# Patient Record
Sex: MALE | Race: OTHER | Employment: FULL TIME | URBAN - METROPOLITAN AREA
[De-identification: names, ages, dates, MRNs, and addresses within clinical notes are randomized per-mention and may not be internally consistent; named-entity substitution may affect disease eponyms.]

---

## 2018-03-09 ENCOUNTER — HOSPITAL ENCOUNTER (OUTPATIENT)
Dept: MRI IMAGING | Age: 58
Discharge: HOME | End: 2018-03-09
Attending: OPHTHALMOLOGY
Payer: COMMERCIAL

## 2018-03-09 DIAGNOSIS — H05.259: ICD-10-CM

## 2018-03-09 PROCEDURE — 70540 MRI ORBIT/FACE/NECK W/O DYE: CPT | Performed by: OPHTHALMOLOGY

## 2018-05-05 ENCOUNTER — HOSPITAL ENCOUNTER (OUTPATIENT)
Dept: MRI IMAGING | Age: 58
Discharge: HOME | End: 2018-05-05
Attending: OPHTHALMOLOGY
Payer: COMMERCIAL

## 2018-05-05 DIAGNOSIS — H05.20 PROPTOSIS: ICD-10-CM

## 2018-05-05 PROCEDURE — 70553 MRI BRAIN STEM W/O & W/DYE: CPT | Performed by: OPHTHALMOLOGY

## 2018-05-05 PROCEDURE — 70543 MRI ORBT/FAC/NCK W/O &W/DYE: CPT | Performed by: OPHTHALMOLOGY

## 2018-11-23 RX ORDER — AMOXICILLIN 500 MG
CAPSULE ORAL DAILY
COMMUNITY
End: 2019-05-03 | Stop reason: ALTCHOICE

## 2018-11-30 ENCOUNTER — ANESTHESIA EVENT (OUTPATIENT)
Dept: ENDOSCOPY | Facility: HOSPITAL | Age: 58
End: 2018-11-30
Payer: COMMERCIAL

## 2018-11-30 ENCOUNTER — HOSPITAL ENCOUNTER (OUTPATIENT)
Facility: HOSPITAL | Age: 58
Setting detail: HOSPITAL OUTPATIENT SURGERY
Discharge: HOME | End: 2018-11-30
Attending: INTERNAL MEDICINE | Admitting: INTERNAL MEDICINE
Payer: COMMERCIAL

## 2018-11-30 ENCOUNTER — ANESTHESIA (OUTPATIENT)
Dept: ENDOSCOPY | Facility: HOSPITAL | Age: 58
End: 2018-11-30
Payer: COMMERCIAL

## 2018-11-30 VITALS
HEIGHT: 63 IN | BODY MASS INDEX: 46.6 KG/M2 | WEIGHT: 263 LBS | OXYGEN SATURATION: 99 % | HEART RATE: 59 BPM | SYSTOLIC BLOOD PRESSURE: 131 MMHG | TEMPERATURE: 98 F | RESPIRATION RATE: 16 BRPM | DIASTOLIC BLOOD PRESSURE: 89 MMHG

## 2018-11-30 DIAGNOSIS — K21.00 GASTROESOPHAGEAL REFLUX DISEASE WITH ESOPHAGITIS: ICD-10-CM

## 2018-11-30 PROCEDURE — 88305 TISSUE EXAM BY PATHOLOGIST: CPT | Performed by: INTERNAL MEDICINE

## 2018-11-30 PROCEDURE — 0DB68ZX EXCISION OF STOMACH, VIA NATURAL OR ARTIFICIAL OPENING ENDOSCOPIC, DIAGNOSTIC: ICD-10-PCS | Performed by: INTERNAL MEDICINE

## 2018-11-30 RX ORDER — SODIUM CHLORIDE, SODIUM LACTATE, POTASSIUM CHLORIDE, CALCIUM CHLORIDE 600; 310; 30; 20 MG/100ML; MG/100ML; MG/100ML; MG/100ML
INJECTION, SOLUTION INTRAVENOUS CONTINUOUS
Status: DISCONTINUED | OUTPATIENT
Start: 2018-11-30 | End: 2018-11-30

## 2018-11-30 RX ORDER — NALOXONE HYDROCHLORIDE 0.4 MG/ML
80 INJECTION, SOLUTION INTRAMUSCULAR; INTRAVENOUS; SUBCUTANEOUS AS NEEDED
Status: CANCELLED | OUTPATIENT
Start: 2018-11-30 | End: 2018-11-30

## 2018-11-30 RX ORDER — SODIUM CHLORIDE, SODIUM LACTATE, POTASSIUM CHLORIDE, CALCIUM CHLORIDE 600; 310; 30; 20 MG/100ML; MG/100ML; MG/100ML; MG/100ML
INJECTION, SOLUTION INTRAVENOUS CONTINUOUS
Status: CANCELLED | OUTPATIENT
Start: 2018-11-30

## 2018-11-30 NOTE — ANESTHESIA POSTPROCEDURE EVALUATION
[address] Patient Status:  Hospital Outpatient Surgery   Age/Gender 60year old male MRN [id number]   Location 118 [facility] Attending Zarina Jones, 160[facility] Day # 0 PCP Florentino Jackson MD       Anesthesia Post-op Note

## 2018-11-30 NOTE — ANESTHESIA PREPROCEDURE EVALUATION
PRE-OP EVALUATION    Patient Name: Stevphen Soulier    Pre-op Diagnosis: Gastroesophageal reflux disease with esophagitis [K21.0]    Procedure(s):  ESOPHAGOGASTRODUODENOSCOPY    Surgeon(s) and Role:     * Binh Lovelace, DO - Primary    Pre-op vitals revie Available pre-op labs reviewed. Airway      Mallampati: III  Mouth opening: >3 FB  TM distance: > 6 cm  Neck ROM: limited Cardiovascular    Cardiovascular exam normal.         Dental    No notable dental history.          Pulmonary    Pulm

## 2018-11-30 NOTE — H&P
History & Physical Examination    Patient Name: Daryle Pain  MRN: [id number]  [facility]: [id number]  YOB: 1960    Diagnosis: chronic gerd    Present Illness: 59 y/o M history as above presents for EGD      Medications Prior to Admission:  Omega-3 If not normal, please explain:   HEENT [ x] [x ]    NECK & BACK [ x] [ x]    HEART [ x] [x ]    LUNGS [ x] [ x]    ABDOMEN [ x] [x ]    UROGENITAL [ n/a] [ n/a]    EXTREMITIES [ x] [x ]    OTHER        [ x ] I have discussed the risks and benefits and alte

## 2018-11-30 NOTE — OPERATIVE REPORT
Qasim Curtis Patient Status:  Hospital Outpatient Surgery    1960 MRN [id number]   [facility] ENDOSCOPY Attending Bartolo De León, DO   Hosp Day # 0 PCP Aren Steiner MD       PREOPERATIVE DIAGNOSIS/INDICATION: Chronic STEVEN ranitidine. 3. Follow anti-reflux measures as discussed in office. 4. Follow up in GI office in 3 months w/ NP.       Jenise Flanagan  Gastroenterology/Advanced Endoscopy  [facility]

## 2019-05-02 ENCOUNTER — ORDER TRANSCRIPTION (OUTPATIENT)
Dept: ADMINISTRATIVE | Facility: HOSPITAL | Age: 59
End: 2019-05-02

## 2019-05-02 DIAGNOSIS — M19.012 PRIMARY OSTEOARTHRITIS OF LEFT SHOULDER: Primary | ICD-10-CM

## 2019-05-03 RX ORDER — SODIUM CHLORIDE, SODIUM LACTATE, POTASSIUM CHLORIDE, CALCIUM CHLORIDE 600; 310; 30; 20 MG/100ML; MG/100ML; MG/100ML; MG/100ML
INJECTION, SOLUTION INTRAVENOUS CONTINUOUS
Status: CANCELLED | OUTPATIENT
Start: 2019-05-03

## 2019-05-03 RX ORDER — HYDROCODONE BITARTRATE AND ACETAMINOPHEN 5; 325 MG/1; MG/1
1 TABLET ORAL EVERY 6 HOURS PRN
COMMUNITY
End: 2019-10-10 | Stop reason: ALTCHOICE

## 2019-05-10 ENCOUNTER — HOSPITAL ENCOUNTER (OUTPATIENT)
Dept: MRI IMAGING | Facility: HOSPITAL | Age: 59
Discharge: HOME | End: 2019-05-10
Attending: ORTHOPAEDIC SURGERY
Payer: COMMERCIAL

## 2019-05-10 ENCOUNTER — HOSPITAL ENCOUNTER (OUTPATIENT)
Dept: GENERAL RADIOLOGY | Facility: HOSPITAL | Age: 59
Discharge: HOME | End: 2019-05-10
Attending: ORTHOPAEDIC SURGERY
Payer: COMMERCIAL

## 2019-05-10 ENCOUNTER — ANESTHESIA EVENT (OUTPATIENT)
Dept: GENERAL RADIOLOGY | Facility: HOSPITAL | Age: 59
End: 2019-05-10
Payer: COMMERCIAL

## 2019-05-10 ENCOUNTER — ANESTHESIA (OUTPATIENT)
Dept: GENERAL RADIOLOGY | Facility: HOSPITAL | Age: 59
End: 2019-05-10
Payer: COMMERCIAL

## 2019-05-10 VITALS
WEIGHT: 255 LBS | RESPIRATION RATE: 16 BRPM | DIASTOLIC BLOOD PRESSURE: 75 MMHG | OXYGEN SATURATION: 99 % | BODY MASS INDEX: 45.18 KG/M2 | HEART RATE: 61 BPM | TEMPERATURE: 97 F | HEIGHT: 63 IN | SYSTOLIC BLOOD PRESSURE: 126 MMHG

## 2019-05-10 DIAGNOSIS — M19.012 PRIMARY OSTEOARTHRITIS OF LEFT SHOULDER: ICD-10-CM

## 2019-05-10 DIAGNOSIS — Z13.89 ENCOUNTER FOR IMAGING TO SCREEN FOR METAL PRIOR TO MRI: ICD-10-CM

## 2019-05-10 PROCEDURE — 73222 MRI JOINT UPR EXTREM W/DYE: CPT | Performed by: ORTHOPAEDIC SURGERY

## 2019-05-10 PROCEDURE — 77002 NEEDLE LOCALIZATION BY XRAY: CPT | Performed by: ORTHOPAEDIC SURGERY

## 2019-05-10 PROCEDURE — A9575 INJ GADOTERATE MEGLUMI 0.1ML: HCPCS | Performed by: ORTHOPAEDIC SURGERY

## 2019-05-10 PROCEDURE — 23350 INJECTION FOR SHOULDER X-RAY: CPT | Performed by: ORTHOPAEDIC SURGERY

## 2019-05-10 RX ORDER — MEPERIDINE HYDROCHLORIDE 25 MG/ML
12.5 INJECTION INTRAMUSCULAR; INTRAVENOUS; SUBCUTANEOUS AS NEEDED
Status: DISCONTINUED | OUTPATIENT
Start: 2019-05-10 | End: 2019-05-12

## 2019-05-10 RX ORDER — MIDAZOLAM HYDROCHLORIDE 1 MG/ML
1 INJECTION INTRAMUSCULAR; INTRAVENOUS EVERY 5 MIN PRN
Status: ACTIVE | OUTPATIENT
Start: 2019-05-10 | End: 2019-05-10

## 2019-05-10 RX ORDER — ONDANSETRON 2 MG/ML
4 INJECTION INTRAMUSCULAR; INTRAVENOUS AS NEEDED
Status: ACTIVE | OUTPATIENT
Start: 2019-05-10 | End: 2019-05-10

## 2019-05-10 RX ORDER — HYDROCODONE BITARTRATE AND ACETAMINOPHEN 5; 325 MG/1; MG/1
1 TABLET ORAL AS NEEDED
Status: DISCONTINUED | OUTPATIENT
Start: 2019-05-10 | End: 2019-05-12

## 2019-05-10 RX ORDER — SODIUM CHLORIDE, SODIUM LACTATE, POTASSIUM CHLORIDE, CALCIUM CHLORIDE 600; 310; 30; 20 MG/100ML; MG/100ML; MG/100ML; MG/100ML
INJECTION, SOLUTION INTRAVENOUS CONTINUOUS
Status: DISCONTINUED | OUTPATIENT
Start: 2019-05-10 | End: 2019-05-12

## 2019-05-10 RX ORDER — METOCLOPRAMIDE HYDROCHLORIDE 5 MG/ML
10 INJECTION INTRAMUSCULAR; INTRAVENOUS AS NEEDED
Status: ACTIVE | OUTPATIENT
Start: 2019-05-10 | End: 2019-05-10

## 2019-05-10 RX ORDER — HYDROMORPHONE HYDROCHLORIDE 1 MG/ML
0.4 INJECTION, SOLUTION INTRAMUSCULAR; INTRAVENOUS; SUBCUTANEOUS EVERY 5 MIN PRN
Status: ACTIVE | OUTPATIENT
Start: 2019-05-10 | End: 2019-05-10

## 2019-05-10 RX ORDER — HYDROCODONE BITARTRATE AND ACETAMINOPHEN 5; 325 MG/1; MG/1
2 TABLET ORAL AS NEEDED
Status: DISCONTINUED | OUTPATIENT
Start: 2019-05-10 | End: 2019-05-12

## 2019-05-10 RX ORDER — NALOXONE HYDROCHLORIDE 0.4 MG/ML
80 INJECTION, SOLUTION INTRAMUSCULAR; INTRAVENOUS; SUBCUTANEOUS AS NEEDED
Status: ACTIVE | OUTPATIENT
Start: 2019-05-10 | End: 2019-05-10

## 2019-05-10 RX ORDER — DIPHENHYDRAMINE HYDROCHLORIDE 50 MG/ML
12.5 INJECTION INTRAMUSCULAR; INTRAVENOUS AS NEEDED
Status: ACTIVE | OUTPATIENT
Start: 2019-05-10 | End: 2019-05-10

## 2019-05-10 NOTE — ANESTHESIA POSTPROCEDURE EVALUATION
[address] Patient Status:  Outpatient   Age/Gender 60year old male MRN [id number]   [facility] MRI Attending Nasir Craft MD   Hosp Day # 0 PCP Dominga Villafana MD       Anesthesia Post-op Note    * No procedures listed *

## 2019-05-10 NOTE — ANESTHESIA PREPROCEDURE EVALUATION
PRE-OP EVALUATION    Patient Name: Neela White    Pre-op Diagnosis: * No pre-op diagnosis entered *    * No procedures listed *    * No surgeons found in log *    Pre-op vitals reviewed.   Temp: 97.4 °F (36.3 °C)  Pulse: 67  Resp: 14  BP: 132/83  SpO2: 9 Smoking status: Former Smoker        Packs/day: 0.50        Years: 10.00        Pack years: 5        Quit date: 1985        Years since quittin.4      Smokeless tobacco: Never Used      Tobacco comment: quit in early 's     Alcohol use:  Yes

## 2019-06-23 ENCOUNTER — APPOINTMENT (OUTPATIENT)
Dept: ULTRASOUND IMAGING | Facility: HOSPITAL | Age: 59
End: 2019-06-23
Attending: EMERGENCY MEDICINE
Payer: COMMERCIAL

## 2019-06-23 ENCOUNTER — APPOINTMENT (OUTPATIENT)
Dept: CT IMAGING | Facility: HOSPITAL | Age: 59
End: 2019-06-23
Attending: EMERGENCY MEDICINE
Payer: COMMERCIAL

## 2019-06-23 ENCOUNTER — APPOINTMENT (OUTPATIENT)
Dept: GENERAL RADIOLOGY | Facility: HOSPITAL | Age: 59
End: 2019-06-23
Attending: EMERGENCY MEDICINE
Payer: COMMERCIAL

## 2019-06-23 ENCOUNTER — HOSPITAL ENCOUNTER (EMERGENCY)
Facility: HOSPITAL | Age: 59
Discharge: HOME | End: 2019-06-23
Attending: EMERGENCY MEDICINE
Payer: COMMERCIAL

## 2019-06-23 VITALS
DIASTOLIC BLOOD PRESSURE: 75 MMHG | OXYGEN SATURATION: 96 % | TEMPERATURE: 97 F | HEART RATE: 80 BPM | RESPIRATION RATE: 20 BRPM | BODY MASS INDEX: 42.68 KG/M2 | SYSTOLIC BLOOD PRESSURE: 136 MMHG | HEIGHT: 64 IN | WEIGHT: 250 LBS

## 2019-06-23 DIAGNOSIS — R06.02 SHORTNESS OF BREATH: Primary | ICD-10-CM

## 2019-06-23 PROBLEM — E87.6 HYPOKALEMIA: Status: ACTIVE | Noted: 2019-06-23

## 2019-06-23 PROCEDURE — 81003 URINALYSIS AUTO W/O SCOPE: CPT | Performed by: EMERGENCY MEDICINE

## 2019-06-23 PROCEDURE — 84484 ASSAY OF TROPONIN QUANT: CPT | Performed by: EMERGENCY MEDICINE

## 2019-06-23 PROCEDURE — 93971 EXTREMITY STUDY: CPT | Performed by: EMERGENCY MEDICINE

## 2019-06-23 PROCEDURE — 99284 EMERGENCY DEPT VISIT MOD MDM: CPT

## 2019-06-23 PROCEDURE — 85610 PROTHROMBIN TIME: CPT | Performed by: EMERGENCY MEDICINE

## 2019-06-23 PROCEDURE — 93010 ELECTROCARDIOGRAM REPORT: CPT

## 2019-06-23 PROCEDURE — 36415 COLL VENOUS BLD VENIPUNCTURE: CPT

## 2019-06-23 PROCEDURE — 93970 EXTREMITY STUDY: CPT | Performed by: EMERGENCY MEDICINE

## 2019-06-23 PROCEDURE — 99285 EMERGENCY DEPT VISIT HI MDM: CPT

## 2019-06-23 PROCEDURE — 71275 CT ANGIOGRAPHY CHEST: CPT | Performed by: EMERGENCY MEDICINE

## 2019-06-23 PROCEDURE — 85730 THROMBOPLASTIN TIME PARTIAL: CPT | Performed by: EMERGENCY MEDICINE

## 2019-06-23 PROCEDURE — 93005 ELECTROCARDIOGRAM TRACING: CPT

## 2019-06-23 PROCEDURE — 85025 COMPLETE CBC W/AUTO DIFF WBC: CPT | Performed by: EMERGENCY MEDICINE

## 2019-06-23 PROCEDURE — 80053 COMPREHEN METABOLIC PANEL: CPT | Performed by: EMERGENCY MEDICINE

## 2019-06-23 PROCEDURE — 71045 X-RAY EXAM CHEST 1 VIEW: CPT | Performed by: EMERGENCY MEDICINE

## 2019-06-23 PROCEDURE — 83880 ASSAY OF NATRIURETIC PEPTIDE: CPT | Performed by: EMERGENCY MEDICINE

## 2019-06-23 RX ORDER — ASPIRIN 325 MG
325 TABLET ORAL DAILY
COMMUNITY
End: 2020-03-11

## 2019-06-23 NOTE — ED INITIAL ASSESSMENT (HPI)
SOB, pain to left shoulder and arm starting early this am.  Trouble taking deep breath. No c/o chest pain. Shoulder surgery on June 10th.

## 2019-06-23 NOTE — ED NOTES
Rounded on pt. Back from CT. Updated on POC. Not in any distress. Resting on cart with family at bedside.

## 2019-06-23 NOTE — ED PROVIDER NOTES
Patient Seen in: [facility] Emergency Department    History   Patient presents with:  Dyspnea HARESH SOB (respiratory)    Stated Complaint: Fartun 10 shoulder surgery, feeling sob    HPI    Patient presents with shortness of breath.   The patient states t date: 1985        Years since quittin.6      Smokeless tobacco: Never Used      Tobacco comment: quit in early 80's     Alcohol use:  Yes      Alcohol/week: 3.0 oz      Types: 3 Cans of beer, 2 Glasses of wine per week    Drug use: No      Review were created for panel order CBC WITH DIFFERENTIAL WITH PLATELET.   Procedure                               Abnormality         Status                     ---------                               -----------         ------                     CBC W/ DIFFERJOMAR calcification. PLEURA:  No mass or effusion. CHEST WALL:  No mass or axillary adenopathy. LIMITED ABDOMEN:  Small hiatal hernia is noted. BONES:  No bony lesion or fracture. OTHER:  Negative. CONCLUSION:   1.  No evidence of pulmonary embolus in th venous system. B-mode two-dimensional images of the vascular structures, Doppler spectral analysis, and color flow. Doppler imaging were performed.   The following veins were imaged bilaterally:  Common, deep, and superficial femoral, popliteal, sapheno-fe apnea. He was encouraged to minimize the use of Norco as much as possible. He will return to the emergency department if his symptoms worsen.     Disposition and Plan     Clinical Impression:  Shortness of breath  (primary encounter diagnosis)    Disposit

## 2019-09-16 ENCOUNTER — OFFICE VISIT (OUTPATIENT)
Dept: SLEEP CENTER | Age: 59
End: 2019-09-16
Attending: INTERNAL MEDICINE
Payer: COMMERCIAL

## 2019-09-16 DIAGNOSIS — R06.81 APNEA: ICD-10-CM

## 2019-09-16 PROCEDURE — 95810 POLYSOM 6/> YRS 4/> PARAM: CPT

## 2019-09-24 NOTE — PROCEDURES
[address] [address],[facility] 100       Accredited by the [facility] ([facility])    PATIENT'S NAME:        Raymundo Joseph  ATTENDING PHYSICIAN:   Scarlet Muir M.D.   REFERRING PHYSICIAN:     PATIENT ACCOUNT #:     [id number] had a total of 194 obstructive apneic or hypopneic episodes yielding an overall apnea-hypopnea index of 50. Supine AHI was 98, lateral AHI was 42, REM AHI was 50. The patient did not have any central apneas.   Oxygen saturation jp was 78%, and the damari

## 2019-10-25 ENCOUNTER — OFFICE VISIT (OUTPATIENT)
Dept: SLEEP CENTER | Age: 59
End: 2019-10-25
Attending: INTERNAL MEDICINE
Payer: COMMERCIAL

## 2019-10-25 DIAGNOSIS — G47.10 HYPERSOMNIA: ICD-10-CM

## 2019-10-25 DIAGNOSIS — G47.61 PLMD (PERIODIC LIMB MOVEMENT DISORDER): ICD-10-CM

## 2019-10-25 DIAGNOSIS — E66.01 CLASS 3 SEVERE OBESITY DUE TO EXCESS CALORIES WITH SERIOUS COMORBIDITY AND BODY MASS INDEX (BMI) OF 40.0 TO 44.9 IN ADULT (HCC): ICD-10-CM

## 2019-10-25 DIAGNOSIS — G47.33 OSA (OBSTRUCTIVE SLEEP APNEA): ICD-10-CM

## 2019-10-25 DIAGNOSIS — R06.83 SNORING: ICD-10-CM

## 2019-10-25 PROCEDURE — 95811 POLYSOM 6/>YRS CPAP 4/> PARM: CPT

## 2019-11-05 NOTE — PROCEDURES
[address] [address],[facility] 100       Accredited by the [facility] ([facility])    PATIENT'S NAME:        Kevin iWlliamson  ATTENDING PHYSICIAN:   Jerrod Logan M.D. REFERRING PHYSICIAN:   Jerrod Logan M.D.   PATIENT The patient was initiated on CPAP of 5 cm of water and titrated up to a final pressure of 12 cm of water.   On this setting, patient was able to achieve periods of stage REM sleep in the supine position and his oxygen saturation remained greater than 88% th

## 2020-03-10 ENCOUNTER — ORDER TRANSCRIPTION (OUTPATIENT)
Dept: ADMINISTRATIVE | Facility: HOSPITAL | Age: 60
End: 2020-03-10

## 2020-03-10 DIAGNOSIS — M75.122 COMPLETE ROTATOR CUFF TEAR OR RUPTURE OF LEFT SHOULDER, NOT SPECIFIED AS TRAUMATIC: Primary | ICD-10-CM

## 2020-03-11 RX ORDER — DICLOFENAC SODIUM 75 MG/1
75 TABLET, DELAYED RELEASE ORAL 2 TIMES DAILY PRN
COMMUNITY

## 2020-03-11 RX ORDER — SODIUM CHLORIDE, SODIUM LACTATE, POTASSIUM CHLORIDE, CALCIUM CHLORIDE 600; 310; 30; 20 MG/100ML; MG/100ML; MG/100ML; MG/100ML
INJECTION, SOLUTION INTRAVENOUS CONTINUOUS
Status: CANCELLED | OUTPATIENT
Start: 2020-03-11

## 2020-03-15 ENCOUNTER — ANESTHESIA EVENT (OUTPATIENT)
Dept: MRI IMAGING | Facility: HOSPITAL | Age: 60
End: 2020-03-15
Payer: COMMERCIAL

## 2020-03-16 ENCOUNTER — ANESTHESIA (OUTPATIENT)
Dept: MRI IMAGING | Facility: HOSPITAL | Age: 60
End: 2020-03-16
Payer: COMMERCIAL

## 2020-03-16 ENCOUNTER — APPOINTMENT (OUTPATIENT)
Dept: LAB | Facility: HOSPITAL | Age: 60
End: 2020-03-16
Attending: ORTHOPAEDIC SURGERY
Payer: COMMERCIAL

## 2020-03-16 ENCOUNTER — HOSPITAL ENCOUNTER (OUTPATIENT)
Dept: GENERAL RADIOLOGY | Facility: HOSPITAL | Age: 60
Discharge: HOME | End: 2020-03-16
Attending: ORTHOPAEDIC SURGERY
Payer: COMMERCIAL

## 2020-03-16 ENCOUNTER — HOSPITAL ENCOUNTER (OUTPATIENT)
Dept: MRI IMAGING | Facility: HOSPITAL | Age: 60
End: 2020-03-16
Attending: ORTHOPAEDIC SURGERY
Payer: COMMERCIAL

## 2020-03-16 ENCOUNTER — HOSPITAL ENCOUNTER (OUTPATIENT)
Dept: MRI IMAGING | Facility: HOSPITAL | Age: 60
Discharge: HOME | End: 2020-03-16
Attending: ORTHOPAEDIC SURGERY
Payer: COMMERCIAL

## 2020-03-16 ENCOUNTER — APPOINTMENT (OUTPATIENT)
Dept: GENERAL RADIOLOGY | Facility: HOSPITAL | Age: 60
End: 2020-03-16
Attending: ORTHOPAEDIC SURGERY
Payer: COMMERCIAL

## 2020-03-16 VITALS
SYSTOLIC BLOOD PRESSURE: 122 MMHG | OXYGEN SATURATION: 98 % | RESPIRATION RATE: 18 BRPM | DIASTOLIC BLOOD PRESSURE: 83 MMHG | HEIGHT: 65 IN | WEIGHT: 245 LBS | TEMPERATURE: 98 F | BODY MASS INDEX: 40.82 KG/M2 | HEART RATE: 63 BPM

## 2020-03-16 VITALS
TEMPERATURE: 98 F | OXYGEN SATURATION: 95 % | HEART RATE: 74 BPM | DIASTOLIC BLOOD PRESSURE: 88 MMHG | RESPIRATION RATE: 17 BRPM | SYSTOLIC BLOOD PRESSURE: 136 MMHG

## 2020-03-16 DIAGNOSIS — M75.122 COMPLETE ROTATOR CUFF TEAR OR RUPTURE OF LEFT SHOULDER, NOT SPECIFIED AS TRAUMATIC: ICD-10-CM

## 2020-03-16 PROCEDURE — A9575 INJ GADOTERATE MEGLUMI 0.1ML: HCPCS | Performed by: ORTHOPAEDIC SURGERY

## 2020-03-16 PROCEDURE — 77002 NEEDLE LOCALIZATION BY XRAY: CPT | Performed by: ORTHOPAEDIC SURGERY

## 2020-03-16 PROCEDURE — 73222 MRI JOINT UPR EXTREM W/DYE: CPT | Performed by: ORTHOPAEDIC SURGERY

## 2020-03-16 PROCEDURE — 23350 INJECTION FOR SHOULDER X-RAY: CPT | Performed by: ORTHOPAEDIC SURGERY

## 2020-03-16 PROCEDURE — 76937 US GUIDE VASCULAR ACCESS: CPT

## 2020-03-16 PROCEDURE — 36410 VNPNXR 3YR/> PHY/QHP DX/THER: CPT

## 2020-03-16 RX ORDER — SODIUM CHLORIDE 9 MG/ML
INJECTION, SOLUTION INTRAVENOUS CONTINUOUS
Status: DISCONTINUED | OUTPATIENT
Start: 2020-03-16 | End: 2020-03-18

## 2020-03-16 RX ORDER — NALOXONE HYDROCHLORIDE 0.4 MG/ML
80 INJECTION, SOLUTION INTRAMUSCULAR; INTRAVENOUS; SUBCUTANEOUS AS NEEDED
Status: CANCELLED | OUTPATIENT
Start: 2020-03-16 | End: 2020-03-17

## 2020-03-16 RX ORDER — HYDROMORPHONE HYDROCHLORIDE 1 MG/ML
0.4 INJECTION, SOLUTION INTRAMUSCULAR; INTRAVENOUS; SUBCUTANEOUS EVERY 5 MIN PRN
Status: DISCONTINUED | OUTPATIENT
Start: 2020-03-16 | End: 2020-03-17

## 2020-03-16 RX ORDER — HYDROCODONE BITARTRATE AND ACETAMINOPHEN 5; 325 MG/1; MG/1
2 TABLET ORAL AS NEEDED
Status: DISCONTINUED | OUTPATIENT
Start: 2020-03-16 | End: 2020-03-18

## 2020-03-16 RX ORDER — HYDROCODONE BITARTRATE AND ACETAMINOPHEN 5; 325 MG/1; MG/1
1 TABLET ORAL AS NEEDED
Status: DISCONTINUED | OUTPATIENT
Start: 2020-03-16 | End: 2020-03-18

## 2020-03-16 RX ORDER — SODIUM CHLORIDE, SODIUM LACTATE, POTASSIUM CHLORIDE, CALCIUM CHLORIDE 600; 310; 30; 20 MG/100ML; MG/100ML; MG/100ML; MG/100ML
INJECTION, SOLUTION INTRAVENOUS CONTINUOUS
Status: CANCELLED | OUTPATIENT
Start: 2020-03-16

## 2020-03-16 RX ORDER — HYDROCODONE BITARTRATE AND ACETAMINOPHEN 5; 325 MG/1; MG/1
1 TABLET ORAL AS NEEDED
Status: CANCELLED | OUTPATIENT
Start: 2020-03-16

## 2020-03-16 RX ORDER — HYDROCODONE BITARTRATE AND ACETAMINOPHEN 5; 325 MG/1; MG/1
2 TABLET ORAL AS NEEDED
Status: CANCELLED | OUTPATIENT
Start: 2020-03-16

## 2020-03-16 RX ORDER — ONDANSETRON 2 MG/ML
4 INJECTION INTRAMUSCULAR; INTRAVENOUS ONCE
Status: DISCONTINUED | OUTPATIENT
Start: 2020-03-16 | End: 2020-03-18

## 2020-03-16 RX ORDER — METOCLOPRAMIDE HYDROCHLORIDE 5 MG/ML
INJECTION INTRAMUSCULAR; INTRAVENOUS
Status: COMPLETED
Start: 2020-03-16 | End: 2020-03-16

## 2020-03-16 RX ORDER — SODIUM CHLORIDE, SODIUM LACTATE, POTASSIUM CHLORIDE, CALCIUM CHLORIDE 600; 310; 30; 20 MG/100ML; MG/100ML; MG/100ML; MG/100ML
INJECTION, SOLUTION INTRAVENOUS CONTINUOUS
Status: DISCONTINUED | OUTPATIENT
Start: 2020-03-16 | End: 2020-03-18

## 2020-03-16 RX ORDER — HYDROMORPHONE HYDROCHLORIDE 1 MG/ML
0.4 INJECTION, SOLUTION INTRAMUSCULAR; INTRAVENOUS; SUBCUTANEOUS EVERY 5 MIN PRN
Status: CANCELLED | OUTPATIENT
Start: 2020-03-16 | End: 2020-03-17

## 2020-03-16 RX ORDER — NALOXONE HYDROCHLORIDE 0.4 MG/ML
80 INJECTION, SOLUTION INTRAMUSCULAR; INTRAVENOUS; SUBCUTANEOUS AS NEEDED
Status: DISCONTINUED | OUTPATIENT
Start: 2020-03-16 | End: 2020-03-17

## 2020-03-16 RX ORDER — METOCLOPRAMIDE HYDROCHLORIDE 5 MG/ML
10 INJECTION INTRAMUSCULAR; INTRAVENOUS ONCE
Status: COMPLETED | OUTPATIENT
Start: 2020-03-16 | End: 2020-03-16

## 2020-03-16 RX ADMIN — METOCLOPRAMIDE HYDROCHLORIDE 10 MG: 5 INJECTION INTRAMUSCULAR; INTRAVENOUS at 17:26:00

## 2020-03-16 NOTE — ANESTHESIA PREPROCEDURE EVALUATION
PRE-OP EVALUATION    Patient Name: Mindy Urias    Pre-op Diagnosis:     MRI L shoulder    Pre-op vitals reviewed. Body mass index is 40.77 kg/m². Current medications reviewed.   Hospital Medications:  No current facility-administered medication Glasses of wine per week      Drug use: No     Available pre-op labs reviewed.                Airway      Mallampati: III  Mouth opening: 3 FB  TM distance: 4 - 6 cm   Cardiovascular    Cardiovascular exam normal.  Rhythm: regular  Rate: normal     Dental

## 2020-03-16 NOTE — ANESTHESIA POSTPROCEDURE EVALUATION
[address] Patient Status:  Outpatient   Age/Gender 60year old male MRN [id number]   [facility] MRI Attending Lila Crocker MD   Hosp Day # 0 PCP Stephenie Rahman MD       Anesthesia Post-op Note    MRI L shoulder    Pr

## (undated) DEVICE — ENDOSCOPY PACK UPPER: Brand: MEDLINE INDUSTRIES, INC.

## (undated) DEVICE — FILTERLINE NASAL ADULT O2/CO2

## (undated) DEVICE — MEDI-VAC NON-CONDUCTIVE SUCTION TUBING: Brand: CARDINAL HEALTH

## (undated) DEVICE — FORCEP BIOPSY RJ4 LG CAP W/ND

## (undated) DEVICE — 3M™ RED DOT™ MONITORING ELECTRODE WITH FOAM TAPE AND STICKY GEL, 50/BAG, 20/CASE, 72/PLT 2570: Brand: RED DOT™

## (undated) DEVICE — Device: Brand: DEFENDO AIR/WATER/SUCTION AND BIOPSY VALVE

## (undated) DEVICE — 1200CC GUARDIAN II: Brand: GUARDIAN

## (undated) DEVICE — MEDI-VAC SUCTION HANDLE REGULAR CAPACITY: Brand: CARDINAL HEALTH

## (undated) NOTE — ED AVS SNAPSHOT
Lane Willaimson   MRN: [id number]    Department:  [facility] Emergency Department   Date of Visit:  6/23/2019           Disclosure     Insurance plans vary and the physician(s) referred by the ER may not be covered by your plan.  Please contact your tell this physician (or your personal doctor if your instructions are to return to your personal doctor) about any new or lasting problems. The primary care or specialist physician will see patients referred from the [facility] Emergency Department.  Winston Wagner

## (undated) NOTE — LETTER
Date: 2018  Patient Name:  Yessy Sam             Address: [address] [place] [id number]    : 1960      Dear Perez Mcleod,      I hope this letter finds you well.      The biopsies obtained at the time of your recent endoscopic proced